# Patient Record
Sex: FEMALE | Race: WHITE | NOT HISPANIC OR LATINO | Employment: FULL TIME | URBAN - METROPOLITAN AREA
[De-identification: names, ages, dates, MRNs, and addresses within clinical notes are randomized per-mention and may not be internally consistent; named-entity substitution may affect disease eponyms.]

---

## 2024-08-01 ENCOUNTER — OFFICE VISIT (OUTPATIENT)
Dept: DERMATOLOGY | Facility: CLINIC | Age: 59
End: 2024-08-01
Payer: COMMERCIAL

## 2024-08-01 VITALS — BODY MASS INDEX: 22.51 KG/M2 | TEMPERATURE: 97.6 F | WEIGHT: 152 LBS | HEIGHT: 69 IN

## 2024-08-01 DIAGNOSIS — Z86.007 HISTORY OF SQUAMOUS CELL CARCINOMA IN SITU: ICD-10-CM

## 2024-08-01 DIAGNOSIS — D18.01 CHERRY ANGIOMA: ICD-10-CM

## 2024-08-01 DIAGNOSIS — L82.1 SEBORRHEIC KERATOSIS: ICD-10-CM

## 2024-08-01 DIAGNOSIS — L72.0 MILIUM: Primary | ICD-10-CM

## 2024-08-01 PROCEDURE — 99203 OFFICE O/P NEW LOW 30 MIN: CPT | Performed by: DERMATOLOGY

## 2024-08-01 NOTE — PATIENT INSTRUCTIONS
"HISTORY OF SQUAMOUS CELL CARCINOMA IN SITU      Assessment and Plan:  Based on a thorough discussion of this condition and the management approach to it (including a comprehensive discussion of the known risks, side effects and potential benefits of treatment), the patient (family) agrees to implement the following specific plan:  Monitor for any changes   Yearly skin checks    How can SCC be prevented?  The most important way to prevent SCC is to avoid sunburn. This is especially important in childhood and early life. Fair skinned individuals and those with a personal or family history of BCC should protect their skin from sun exposure daily, year-round and lifelong.  Stay indoors or under the shade in the middle of the day   Wear covering clothing   Apply high protection factor SPF50+ broad-spectrum sunscreens generously to exposed skin if outdoors   Avoid indoor tanning (sun beds, solaria)      What is the outlook for SCC?  Most SCC are cured by treatment. Cure is most likely if treatment is undertaken when the lesion is small. A small percent of SCC's can spread to lymph  nodes and long term monitoring is indicated.   They are also at increased risk of other skin cancers, especially melanoma. Regular self-skin examinations and long-term annual skin checks by an experienced health professional are recommended.       ANGIOMA (\"CHAVEZ ANGIOMA\")      Plan:  Discussed that an angioma is due to proliferating endothelial cells; these are the cells that line the inside of a blood vessel.  Genetic analysis has found cherry angiomas frequently carry specific somatic missense mutations in the GNAQ and GNA11 (Q209H) genes, which are also involved in other vascular and melanocytic proliferations.  Chavez angiomas markedly increase in number from about the age of 40, so it has been estimated that 75% of people over 75 years of age have them. Although they also called \"senile angiomas,\" they can occur in young people too - 5% " of adolescents have been found to have them.   Cherry angiomas are very common in males and females of any age or race, with no difference in sexes or races affected. They are however more noticeable in white skin than in skin of colour.  There may be a family history of similar lesions.   Eruptive cherry angiomas have been rarely reported to be associated with internal malignancy and pregnancy.  Asymptomatic. No treatment is required.     PROCEDURES PERFORMED TODAY ASSOCIATED WITH THIS CONDITION:          NONE      MEDICAL DECISION MAKING  Treatment Goal:  Resolution of the SELF-LIMITED OR MINOR PROBLEM condition.       SELF-LIMITED or MINOR PROBLEM.  A problem that runs a definite and prescribed course, is transient in nature, and is not likely to permanently alter health status.  The problem does not need to meet all three of these criteria but must be one that is not likely to permanently alter the patient's health status.           MILIUM       Assessment and Plan:  Based on a thorough discussion of this condition and the management approach to it (including a comprehensive discussion of the known risks, side effects and potential benefits of treatment), the patient (family) agrees to implement the following specific plan:  Benign-assurance provided  Monitor for any changes. Any size or color changes please inform office for a possible biopsy    Assessment and Plan  A milium is a small cyst containing keratin (the skin protein); they are usually multiple and are then known as milia. These harmless cysts present as tiny pearly-white bumps just under the surface of the skin.  Milia are common in all ages and both sexes. They most often arise on the face and are particularly prominent on the eyelids and cheeks, but they may occur elsewhere.  There are various kinds of milia.   milia: Affect 40-50% of  babies, few to numerous lesions, often seen on the nose, but may also arise inside the mouth on the  mucosa (Benjamin pearls) or palate (Madina nodules) or more widely on the scalp, face and upper trunk, Heal spontaneously within a few weeks of birth.  Primary milia in children and adults: found around eyelids, cheeks, forehead and genitalia, in young children, a row of milia may appear along the nasal crease, may clear in a few weeks or persist for months or longer.    Juvenile milia: associated with Rombo syndrome, basal cell naevus syndrome, Fxaff-Mwlqv-Fdmjnocp syndrome, pachyonychia congenita, Marion syndrome and other genetic disorders, may be congenital (present at birth) or appear later in life.  Milia en plaque: multiple milia appear on within an inflamed plaque up to several centimeters in diameter, usually found on an eyelid, behind the ear, on a cheek or jaw.  3. Affect children and adults, especially middle-aged women.  4. Sometimes associated with another skin disease including pseudoxanthoma elasticum, discoid lupus erythematosus, lichen planus.  Multiple eruptive milia: crops of numerous milia appear over a few weeks to months, lesions may be asymptomatic or itchy, most often affect the face, upper arms and upper trunk.  Traumatic milia: occur at the site of injury as skin heals, arise from eccrine sweat ducts, examples include thermal burns, dermabrasion, blistering rashes such as bullous pemphigoid, often seen on the back of hands and fingers in porphyria cutanea tarda, a milia-like calcified nodule may develop after  heel stick blood test.   Milia associated with drugs: may rarely follow the use of topical medication, such as phenols, hydroquinone, 5-fluorouracil cream, and a corticosteroid.    Milia have a characteristic appearance. However, on occasion, a skin biopsy may be performed. This shows a small epidermoid cyst coming from a vellus hair follicle.  Milia should be distinguished from other types of cyst, comedones, xanthelasma and syringomas. Colloid milia are varela coloured bumps  on cheeks and temples associated with excessive exposure to sunlight.  They should also be distinguished from milia-like cysts noted on dermoscopy in seborrhoeic keratoses, papillomatous moles and some basal cell carcinomas.  Milia do not need to be treated unless they are a cause for concern for the patient. They often clear up by themselves within a few months. Where possible, further trauma should be minimised to reduce the development of new lesions.  The lesion may be de-roofed using a sterile needle or blade and the contents squeezed or pricked out.  They may be destroyed using diathermy and curettage, or cryotherapy.  For widespread lesions, topical retinoids may be helpful.  Chemical peels, dermabrasion and laser ablation have been reported to be effective when used for very extensive milia.  Milia en plaque may improve with minocycline (a tetracycline antibiotic).     SEBORRHEIC KERATOSIS; NON-INFLAMED      Assessment and Plan:  Based on a thorough discussion of this condition and the management approach to it (including a comprehensive discussion of the known risks, side effects and potential benefits of treatment), the patient (family) agrees to implement the following specific plan:  Benign-Assurance provided  Monitor for any changes   Discussed cosmetic treatment     Seborrheic Keratosis  A seborrheic keratosis is a harmless warty spot that appears during adult life as a common sign of skin aging.  Seborrheic keratoses can arise on any area of skin, covered or uncovered, with the usual exception of the palms and soles. They do not arise from mucous membranes. Seborrheic keratoses can have highly variable appearance.      Seborrheic keratoses are extremely common. It has been estimated that over 90% of adults over the age of 60 years have one or more of them. They occur in males and females of all races, typically beginning to erupt in the 30s or 40s. They are uncommon under the age of 20 years.  The  "precise cause of seborrhoeic keratoses is not known.  Seborrhoeic keratoses are considered degenerative in nature. As time goes by, seborrheic keratoses tend to become more numerous. Some people inherit a tendency to develop a very large number of them; some people may have hundreds of them.    The name \"seborrheic keratosis\" is misleading, because these lesions are not limited to a seborrhoeic distribution (scalp, mid-face, chest, upper back), nor are they formed from sebaceous glands, nor are they associated with sebum -- which is greasy.  Seborrheic keratosis may also be called \"SK,\" \"Seb K,\" \"basal cell papilloma,\" \"senile wart,\" or \"barnacle.\"      Researchers have noted:  Eruptive seborrhoeic keratoses can follow sunburn or dermatitis  Skin friction may be the reason they appear in body folds  Viral cause (e.g., human papillomavirus) seems unlikely  Stable and clonal mutations or activation of FRFR3, PIK3CA, ALBERTINA, AKT1 and EGFR genes are found in seborrhoeic keratoses  Seborrhoeic keratosis can arise from solar lentigo  FRFR3 mutations also arise in solar lentigines. These mutations are associated with increased age and location on the head and neck, suggesting a role of ultraviolet radiation in these lesions  Seborrheic keratoses do not harbour tumour suppressor gene mutations  Epidermal growth factor receptor inhibitors, which are used to treat some cancers, often result in an increase in verrucal (warty) keratoses.    There is no easy way to remove multiple lesions on a single occasion.  Unless a specific lesion is \"inflamed\" and is causing pain or stinging/burning or is bleeding, most insurance companies do not authorize treatment.   "

## 2024-08-01 NOTE — PROGRESS NOTES
"[facility] Note     Patient Name: Sugar Hummel  Encounter Date: 8/1/24     Have you been cared for by a [facility] Dermatologist in the last 3 years and, if so, which description applies to you?    NO.   I am considered a \"new\" patient and must complete all patient intake questions. I am FEMALE/of child-bearing potential.    REVIEW OF SYSTEMS:  Have you recently had or currently have any of the following? Recent fever or chills? No  Any non-healing wound? No  Are you pregnant or planning to become pregnant? No  Are you currently or planning to be nursing or breast feeding? No   PAST MEDICAL HISTORY:  Have you personally ever had or currently have any of the following?  If \"YES,\" then please provide more detail. Skin cancer (such as Melanoma, Basal Cell Carcinoma, Squamous Cell Carcinoma?  YES, SCC  Tuberculosis, HIV/AIDS, Hepatitis B or C: No  Radiation Treatment No   HISTORY OF IMMUNOSUPPRESSION:   Do you have a history of any of the following:  Systemic Immunosuppression such as Diabetes, Biologic or Immunotherapy, Chemotherapy, Organ Transplantation, Bone Marrow Transplantation?  No    Answering \"YES\" requires the addition of the dotphrase \"IMMUNOSUPPRESSED\" as the first diagnosis of the patient's visit.   FAMILY HISTORY:  Any \"first degree relatives\" (parent, brother, sister, or child) with the following?    Skin Cancer, Pancreatic or Other Cancer? YES, different types of cancers (see patients family history)   PATIENT EXPERIENCE:    Do you want the Dermatologist to perform a COMPLETE skin exam today including a clinical examination under the \"bra and underwear\" areas?  NO  If necessary, do we have your permission to call and leave a detailed message on your Preferred Phone number that includes your specific medical information?  Yes      Allergies   Allergen Reactions    Ancef [Cefazolin] Hives and Fever    No current outpatient medications on file.          Whom besides the patient is " "providing clinical information about today's encounter?   NO ADDITIONAL HISTORIAN (patient alone provided history)    Physical Exam and Assessment/Plan by Diagnosis:  CHIEF COMPLAINT    59 year old male or female patient presents today for New Patient.  Patient has a History of squamous cell carcinoma       HISTORY OF SQUAMOUS CELL CARCINOMA IN SITU    Physical Exam:  Anatomic Location Affected:  chest-8/2022  Morphological Description of Scar:  well healed   Suspected Recurrence: no  Regional adenopathy: no    Additional History of Present Condition:  MOH's surgery with [facility] on 8/29/22    Assessment and Plan:  Based on a thorough discussion of this condition and the management approach to it (including a comprehensive discussion of the known risks, side effects and potential benefits of treatment), the patient (family) agrees to implement the following specific plan:  Monitor for any changes   Yearly skin checks    How can SCC be prevented?  The most important way to prevent SCC is to avoid sunburn. This is especially important in childhood and early life. Fair skinned individuals and those with a personal or family history of BCC should protect their skin from sun exposure daily, year-round and lifelong.  Stay indoors or under the shade in the middle of the day   Wear covering clothing   Apply high protection factor SPF50+ broad-spectrum sunscreens generously to exposed skin if outdoors   Avoid indoor tanning (sun beds, solaria)      What is the outlook for SCC?  Most SCC are cured by treatment. Cure is most likely if treatment is undertaken when the lesion is small. A small percent of SCC's can spread to lymph  nodes and long term monitoring is indicated.   They are also at increased risk of other skin cancers, especially melanoma. Regular self-skin examinations and long-term annual skin checks by an experienced health professional are recommended.       ANGIOMA (\"CHERRY ANGIOMA\")    Physical " "Exam:  Anatomic Location: right frontal scalp  Morphologic Description: bright red vascular papules  Pertinent Positives:  Pertinent Negatives:    Additional History of Present Condition:  Present on exam.  Asymptomatic    Plan:  Discussed that an angioma is due to proliferating endothelial cells; these are the cells that line the inside of a blood vessel.  Genetic analysis has found cherry angiomas frequently carry specific somatic missense mutations in the GNAQ and GNA11 (Q209H) genes, which are also involved in other vascular and melanocytic proliferations.  Chavez angiomas markedly increase in number from about the age of 40, so it has been estimated that 75% of people over 75 years of age have them. Although they also called \"senile angiomas,\" they can occur in young people too - 5% of adolescents have been found to have them.   Cherry angiomas are very common in males and females of any age or race, with no difference in sexes or races affected. They are however more noticeable in white skin than in skin of colour.  There may be a family history of similar lesions.   Eruptive cherry angiomas have been rarely reported to be associated with internal malignancy and pregnancy.  Asymptomatic. No treatment is required.     PROCEDURES PERFORMED TODAY ASSOCIATED WITH THIS CONDITION:          NONE      MEDICAL DECISION MAKING  Treatment Goal:  Resolution of the SELF-LIMITED OR MINOR PROBLEM condition.       SELF-LIMITED or MINOR PROBLEM.  A problem that runs a definite and prescribed course, is transient in nature, and is not likely to permanently alter health status.  The problem does not need to meet all three of these criteria but must be one that is not likely to permanently alter the patient's health status.           MILIUM     Physical Exam:  Anatomic Location Affected: left side of nose    Morphological Description:  pinpoint white-yellow papule   Pertinent Positives:  Pertinent Negatives:    Additional History " "of Present Condition:  Had for a few months, asx    Assessment and Plan:  Based on a thorough discussion of this condition and the management approach to it (including a comprehensive discussion of the known risks, side effects and potential benefits of treatment), the patient (family) agrees to implement the following specific plan:  Benign-assurance provided  Monitor for any changes. Any size or color changes please inform office for a possible biopsy      SEBORRHEIC KERATOSIS; NON-INFLAMED    Physical Exam:  Anatomic Location Affected:  along hairline, scalp   Morphological Description:  Flat and raised, waxy, smooth to warty textured, yellow to brownish-grey to dark brown to blackish, discrete, \"stuck-on\" appearing papules.  Pertinent Positives:  Pertinent Negatives:    Additional History of Present Condition:  Patient reports new bumps on the skin.  Denies itch, burn, pain, bleeding or ulceration.  Present constantly; nothing seems to make it worse or better.  No prior treatment.      Assessment and Plan:  Based on a thorough discussion of this condition and the management approach to it (including a comprehensive discussion of the known risks, side effects and potential benefits of treatment), the patient (family) agrees to implement the following specific plan:  Benign-Assurance provided  Monitor for any changes   Discussed cosmetic treatment       Socorro Gloria MD- Dermatology PGY3    Scribe Attestation      I,:  Brooklynn Galdamez MA am acting as a scribe while in the presence of the attending physician.:       I,:  Kacey An MD personally performed the services described in this documentation    as scribed in my presence.:              "

## 2025-03-25 ENCOUNTER — OFFICE VISIT (OUTPATIENT)
Dept: FAMILY MEDICINE CLINIC | Facility: CLINIC | Age: 60
End: 2025-03-25
Payer: COMMERCIAL

## 2025-03-25 VITALS
SYSTOLIC BLOOD PRESSURE: 130 MMHG | HEIGHT: 69 IN | BODY MASS INDEX: 25.06 KG/M2 | WEIGHT: 169.2 LBS | HEART RATE: 60 BPM | DIASTOLIC BLOOD PRESSURE: 80 MMHG | OXYGEN SATURATION: 98 %

## 2025-03-25 DIAGNOSIS — Z78.0 POST-MENOPAUSAL: ICD-10-CM

## 2025-03-25 DIAGNOSIS — Z12.31 ENCOUNTER FOR SCREENING MAMMOGRAM FOR BREAST CANCER: ICD-10-CM

## 2025-03-25 DIAGNOSIS — F43.9 SITUATIONAL STRESS: ICD-10-CM

## 2025-03-25 DIAGNOSIS — Z00.00 ANNUAL PHYSICAL EXAM: Primary | ICD-10-CM

## 2025-03-25 DIAGNOSIS — M19.041 PRIMARY OSTEOARTHRITIS OF BOTH HANDS: ICD-10-CM

## 2025-03-25 DIAGNOSIS — M19.042 PRIMARY OSTEOARTHRITIS OF BOTH HANDS: ICD-10-CM

## 2025-03-25 PROCEDURE — 99203 OFFICE O/P NEW LOW 30 MIN: CPT | Performed by: FAMILY MEDICINE

## 2025-03-25 PROCEDURE — 99396 PREV VISIT EST AGE 40-64: CPT | Performed by: FAMILY MEDICINE

## 2025-03-25 RX ORDER — TRETINOIN 0.5 MG/G
CREAM TOPICAL
COMMUNITY
Start: 2024-04-03

## 2025-03-25 RX ORDER — FLUOROURACIL 50 MG/G
CREAM TOPICAL
COMMUNITY
Start: 2025-03-16

## 2025-03-26 NOTE — PROGRESS NOTES
Assessment & Plan  Annual physical exam  - Overall health status good  - Up to date with influenza and COVID-19 vaccinations  - Last colonoscopy: 08/03/2017, next scheduled for 2027  - Mammogram ordered  - Routine blood work ordered  - Using sun protection diligently  - Regular dermatology appointments, next scheduled for August 2025  Orders:  •  CBC and differential; Future  •  Comprehensive metabolic panel; Future  •  Hemoglobin A1C; Future  •  Lipid Panel with Direct LDL reflex; Future    Encounter for screening mammogram for breast cancer  Mammogram ordered  Orders:  •  Mammo screening bilateral w 3d and cad; Future    Primary osteoarthritis of both hands  Declines labs, has had autoimmune labs in the past which were normal  - Onset of arthritis in hands with noticeable nodules  - Reduction in gluten and sugar intake has helped reduce nodules  - No anti-inflammatories taken, no pain, only limited motion.  Consider topical voltaren  - Occupational therapy for hand strength and range of motion discussed as potential future intervention- declines for now.         Post-menopausal  She is hesitant to use vaginal estrogen.  Reviewed that systemic absorption is limited but local effects can be beneficial       Situational stress  Coping with situational stressors  Offered support  She will reach out if she finds she is struggling                    Subjective:     Sugar is a 59 y.o. female here today and has the below chronic conditions:    Patient Active Problem List   Diagnosis   • Asthma, exercise induced   • Primary osteoarthritis of both hands   • Post-menopausal     Current Outpatient Medications   Medication Sig Dispense Refill   • estrogens, conjugated (Premarin) vaginal cream Insert into the vagina daily     • fluorouracil (EFUDEX) 5 % cream Per derm     • tretinoin (RETIN-A) 0.05 % cream Per derm       No current facility-administered medications for this visit.          Chief Complaint   Patient  presents with   • Physical Exam     HPI:  - CC above per clinical staff and reviewed.    History of Present Illness  The patient presents to establish care.    General Health  - She perceives herself to be in good health, with no current concerns.  - She is up to date with her influenza and COVID-19 vaccinations.  - She acknowledges the need for a mammogram within the next year.    Skin Health  - She has a history of severe skin damage from childhood due to swimming and working in a pool.  - She has undergone Mohs surgery and is under the care of a dermatologist, with a follow-up appointment scheduled for 08/2025.  - She consistently uses sun protection.  - She has been prescribed fluorouracil 5% and tretinoin 0.05% by her dermatologist.    Gastrointestinal Health  - She reports no gastrointestinal issues, acid reflux, or bowel movement difficulties.  - She underwent a colonoscopy in 08/2017, which was unremarkable.    Migraines  - She has a history of migraines, which she attributes to discontinuing oral contraceptives.  - These migraines have resolved since menopause.    Blood Pressure  - She reports a recent increase in blood pressure to 130/80, which she considers high for her.  - She wonders if this could be related to weight gain over the past 6 months following a move and disruption of her exercise routine.  - She admits to inadequate water intake, consuming only one cup of coffee in the morning and no other beverages throughout the day.    Joint Health  - She reports the development of nodules on her joints, particularly in her hands, which she attributes to the onset of arthritis.  - She has not noticed any similar changes in other major joints.  - She has not sought treatment with anti-inflammatories.  - A reduction in gluten and sugar intake approximately 1.5 years ago resulted in a significant decrease in the size of one nodule.  - She underwent testing for rheumatoid arthritis a few years ago, which was  "negative.  - She experiences limited motion and must constantly stretch.  - She also reports hand swelling the day after consuming gluten.  - She believes she can manage her symptoms with certain strategies but wishes to monitor her condition.    Stress  - She reports that her stress levels increase when she thinks about her parents and their situation.  - Her father is experiencing memory loss, and her mother is struggling     Supplemental information: She has been prescribed estradiol cream by her OB-GYN in [place] but expresses apprehension about hormone replacement therapy.    FAMILY HISTORY  - Both parents have osteoarthritis      IMMUNIZATIONS  - Influenza vaccine: Up to date  - COVID-19 vaccines: Up to date    The following portions of the patient's history were reviewed and updated as appropriate: allergies, current medications, past family history, past medical history, past social history, past surgical history and problem list.    ROS:  Review of Systems   As noted above.    Objective:      /80   Pulse 60   Ht 5' 9\" (1.753 m)   Wt 76.7 kg (169 lb 3.2 oz)   SpO2 98%   BMI 24.99 kg/m²   BP Readings from Last 3 Encounters:   03/25/25 130/80     Wt Readings from Last 3 Encounters:   03/25/25 76.7 kg (169 lb 3.2 oz)   08/01/24 68.9 kg (152 lb)               Physical Exam:   Physical Exam  Vitals and nursing note reviewed.   Constitutional:       General: She is not in acute distress.     Appearance: Normal appearance. She is well-developed. She is not ill-appearing.   HENT:      Head: Normocephalic and atraumatic.      Nose: Nose normal.   Eyes:      Conjunctiva/sclera: Conjunctivae normal.   Neck:      Vascular: No carotid bruit.   Cardiovascular:      Rate and Rhythm: Normal rate and regular rhythm.      Heart sounds: Normal heart sounds. No murmur heard.  Pulmonary:      Effort: Pulmonary effort is normal. No respiratory distress.      Breath sounds: Normal breath sounds. No wheezing.   Abdominal: "      Palpations: Abdomen is soft.      Tenderness: There is no abdominal tenderness. There is no guarding or rebound.   Musculoskeletal:      Cervical back: Neck supple.      Right lower leg: No edema.      Left lower leg: No edema.   Lymphadenopathy:      Cervical: No cervical adenopathy.   Skin:     General: Skin is warm and dry.   Neurological:      Mental Status: She is alert and oriented to person, place, and time.      Gait: Gait normal.   Psychiatric:         Mood and Affect: Mood normal.         Behavior: Behavior normal.                  This office visit note was generated in part with the use of [employer] and/or voice recognition dictation.

## 2025-03-28 PROBLEM — M19.042 PRIMARY OSTEOARTHRITIS OF BOTH HANDS: Status: ACTIVE | Noted: 2025-03-28

## 2025-03-28 PROBLEM — M19.041 PRIMARY OSTEOARTHRITIS OF BOTH HANDS: Status: ACTIVE | Noted: 2025-03-28

## 2025-03-28 PROBLEM — Z78.0 POST-MENOPAUSAL: Status: ACTIVE | Noted: 2025-03-28

## 2025-03-28 NOTE — ASSESSMENT & PLAN NOTE
Declines labs, has had autoimmune labs in the past which were normal  - Onset of arthritis in hands with noticeable nodules  - Reduction in gluten and sugar intake has helped reduce nodules  - No anti-inflammatories taken, no pain, only limited motion.  Consider topical voltaren  - Occupational therapy for hand strength and range of motion discussed as potential future intervention- declines for now.

## 2025-03-28 NOTE — ASSESSMENT & PLAN NOTE
She is hesitant to use vaginal estrogen.  Reviewed that systemic absorption is limited but local effects can be beneficial

## 2025-04-03 ENCOUNTER — APPOINTMENT (OUTPATIENT)
Dept: LAB | Facility: CLINIC | Age: 60
End: 2025-04-03
Payer: COMMERCIAL

## 2025-04-03 DIAGNOSIS — Z00.00 ANNUAL PHYSICAL EXAM: ICD-10-CM

## 2025-04-03 LAB
ALBUMIN SERPL BCG-MCNC: 4.3 G/DL (ref 3.5–5)
ALP SERPL-CCNC: 62 U/L (ref 34–104)
ALT SERPL W P-5'-P-CCNC: 12 U/L (ref 7–52)
ANION GAP SERPL CALCULATED.3IONS-SCNC: 4 MMOL/L (ref 4–13)
AST SERPL W P-5'-P-CCNC: 16 U/L (ref 13–39)
BASOPHILS # BLD AUTO: 0.07 THOUSANDS/ÂΜL (ref 0–0.1)
BASOPHILS NFR BLD AUTO: 1 % (ref 0–1)
BILIRUB SERPL-MCNC: 0.61 MG/DL (ref 0.2–1)
BUN SERPL-MCNC: 21 MG/DL (ref 5–25)
CALCIUM SERPL-MCNC: 9.3 MG/DL (ref 8.4–10.2)
CHLORIDE SERPL-SCNC: 104 MMOL/L (ref 96–108)
CHOLEST SERPL-MCNC: 188 MG/DL (ref ?–200)
CO2 SERPL-SCNC: 30 MMOL/L (ref 21–32)
CREAT SERPL-MCNC: 0.83 MG/DL (ref 0.6–1.3)
EOSINOPHIL # BLD AUTO: 0.6 THOUSAND/ÂΜL (ref 0–0.61)
EOSINOPHIL NFR BLD AUTO: 12 % (ref 0–6)
ERYTHROCYTE [DISTWIDTH] IN BLOOD BY AUTOMATED COUNT: 13.1 % (ref 11.6–15.1)
EST. AVERAGE GLUCOSE BLD GHB EST-MCNC: 120 MG/DL
GFR SERPL CREATININE-BSD FRML MDRD: 77 ML/MIN/1.73SQ M
GLUCOSE P FAST SERPL-MCNC: 86 MG/DL (ref 65–99)
HBA1C MFR BLD: 5.8 %
HCT VFR BLD AUTO: 41.5 % (ref 34.8–46.1)
HDLC SERPL-MCNC: 72 MG/DL
HGB BLD-MCNC: 13.6 G/DL (ref 11.5–15.4)
IMM GRANULOCYTES # BLD AUTO: 0.01 THOUSAND/UL (ref 0–0.2)
IMM GRANULOCYTES NFR BLD AUTO: 0 % (ref 0–2)
LDLC SERPL CALC-MCNC: 103 MG/DL (ref 0–100)
LYMPHOCYTES # BLD AUTO: 1.61 THOUSANDS/ÂΜL (ref 0.6–4.47)
LYMPHOCYTES NFR BLD AUTO: 32 % (ref 14–44)
MCH RBC QN AUTO: 28.9 PG (ref 26.8–34.3)
MCHC RBC AUTO-ENTMCNC: 32.8 G/DL (ref 31.4–37.4)
MCV RBC AUTO: 88 FL (ref 82–98)
MONOCYTES # BLD AUTO: 0.37 THOUSAND/ÂΜL (ref 0.17–1.22)
MONOCYTES NFR BLD AUTO: 7 % (ref 4–12)
NEUTROPHILS # BLD AUTO: 2.45 THOUSANDS/ÂΜL (ref 1.85–7.62)
NEUTS SEG NFR BLD AUTO: 48 % (ref 43–75)
NRBC BLD AUTO-RTO: 0 /100 WBCS
PLATELET # BLD AUTO: 211 THOUSANDS/UL (ref 149–390)
PMV BLD AUTO: 10.9 FL (ref 8.9–12.7)
POTASSIUM SERPL-SCNC: 4.9 MMOL/L (ref 3.5–5.3)
PROT SERPL-MCNC: 6.9 G/DL (ref 6.4–8.4)
RBC # BLD AUTO: 4.71 MILLION/UL (ref 3.81–5.12)
SODIUM SERPL-SCNC: 138 MMOL/L (ref 135–147)
TRIGL SERPL-MCNC: 64 MG/DL (ref ?–150)
WBC # BLD AUTO: 5.11 THOUSAND/UL (ref 4.31–10.16)

## 2025-04-03 PROCEDURE — 85025 COMPLETE CBC W/AUTO DIFF WBC: CPT

## 2025-04-03 PROCEDURE — 80061 LIPID PANEL: CPT

## 2025-04-03 PROCEDURE — 80053 COMPREHEN METABOLIC PANEL: CPT

## 2025-04-03 PROCEDURE — 36415 COLL VENOUS BLD VENIPUNCTURE: CPT

## 2025-04-03 PROCEDURE — 83036 HEMOGLOBIN GLYCOSYLATED A1C: CPT

## 2025-04-08 ENCOUNTER — RESULTS FOLLOW-UP (OUTPATIENT)
Dept: FAMILY MEDICINE CLINIC | Facility: CLINIC | Age: 60
End: 2025-04-08

## 2025-05-20 ENCOUNTER — HOSPITAL ENCOUNTER (OUTPATIENT)
Facility: HOSPITAL | Age: 60
Discharge: HOME | End: 2025-05-20
Payer: COMMERCIAL

## 2025-05-20 VITALS — WEIGHT: 169 LBS | HEIGHT: 69 IN | BODY MASS INDEX: 25.03 KG/M2

## 2025-05-20 DIAGNOSIS — Z12.31 ENCOUNTER FOR SCREENING MAMMOGRAM FOR BREAST CANCER: ICD-10-CM

## 2025-05-20 PROCEDURE — 77063 BREAST TOMOSYNTHESIS BI: CPT

## 2025-05-20 PROCEDURE — 77067 SCR MAMMO BI INCL CAD: CPT
